# Patient Record
Sex: FEMALE | Race: BLACK OR AFRICAN AMERICAN | NOT HISPANIC OR LATINO | URBAN - METROPOLITAN AREA
[De-identification: names, ages, dates, MRNs, and addresses within clinical notes are randomized per-mention and may not be internally consistent; named-entity substitution may affect disease eponyms.]

---

## 2019-02-05 ENCOUNTER — EMERGENCY (EMERGENCY)
Facility: HOSPITAL | Age: 34
LOS: 1 days | Discharge: HOME | End: 2019-02-05
Attending: EMERGENCY MEDICINE
Payer: COMMERCIAL

## 2019-02-05 VITALS
WEIGHT: 119.93 LBS | DIASTOLIC BLOOD PRESSURE: 68 MMHG | OXYGEN SATURATION: 99 % | HEART RATE: 88 BPM | RESPIRATION RATE: 20 BRPM | TEMPERATURE: 98 F | HEIGHT: 64 IN | SYSTOLIC BLOOD PRESSURE: 100 MMHG

## 2019-02-05 PROCEDURE — 99284 EMERGENCY DEPT VISIT MOD MDM: CPT

## 2019-02-05 PROCEDURE — 99284 EMERGENCY DEPT VISIT MOD MDM: CPT | Mod: 25

## 2019-02-05 PROCEDURE — 70450 CT HEAD/BRAIN W/O DYE: CPT

## 2019-02-05 PROCEDURE — 70450 CT HEAD/BRAIN W/O DYE: CPT | Mod: 26

## 2019-02-05 RX ADMIN — Medication 1 TABLET(S): at 19:56

## 2019-02-05 NOTE — ED STATDOCS - OBJECTIVE STATEMENT
34 y/o F pt presents to the ED c/o headache beginning 2 days ago.  Pt notes a constant throbbing occipital HA, with associated nausea and chills.  She notes she was woken up from sleep because of how severe the pain was.  Pt went into Urgent Care for these symptoms and was advised to come into the ED to evaluate for brain bleed.  She has taken Excedrin migraine for this HA with no relief, last dosage 12:30am today.  She has had HAs before, but her HAs are typically frontal.  LMP this week.  No sick contacts, no recent trauma/falls.  Denies fever, CP, SOB, vomiting, photophobia, visual changes.  No further acute complaints at this time.

## 2019-02-05 NOTE — ED STATDOCS - PROGRESS NOTE DETAILS
PA Note: PT evaluated by intake physician. HPI/PE/ROS as noted above. Will follow up plan per intake physician Pts headache has decreased with Fioricet administration. CT head wnl. Pt to discharge with Fioricet and follow up with neurology. Pt instructed on signs/symptoms to return to ED.

## 2019-02-05 NOTE — ED STATDOCS - NS_ ATTENDINGSCRIBEDETAILS _ED_A_ED_FT
I, Baljeet Schroeder, performed the initial face to face bedside interview with this patient regarding history of present illness, review of symptoms and relevant past medical, social and family history.  I completed an independent physical examination.  I was the initial provider who evaluated this patient. I have signed out the follow up of any pending tests (i.e. labs, radiological studies) to the ACP.  I have communicated the patient’s plan of care and disposition with the ACP.  The history, relevant review of systems, past medical and surgical history, medical decision making, and physical examination was documented by the scribe in my presence and I attest to the accuracy of the documentation.

## 2019-02-05 NOTE — ED ADULT NURSE NOTE - NSIMPLEMENTINTERV_GEN_ALL_ED
Implemented All Universal Safety Interventions:  Lookout to call system. Call bell, personal items and telephone within reach. Instruct patient to call for assistance. Room bathroom lighting operational. Non-slip footwear when patient is off stretcher. Physically safe environment: no spills, clutter or unnecessary equipment. Stretcher in lowest position, wheels locked, appropriate side rails in place.

## 2019-02-05 NOTE — ED ADULT NURSE NOTE - OBJECTIVE STATEMENT
c/o headache top of the head, sudden onset , denies any blurry and double vision, c/o slight nausea yesterday

## 2021-07-01 NOTE — ED STATDOCS - NS_EDPROVIDERDISPOUSERTYPE_ED_A_ED
CMP Scribe Attestation (For Scribes USE Only)... potassium serum Scribe Attestation (For Scribes USE Only).../Attending Attestation (For Attendings USE Only)...
